# Patient Record
(demographics unavailable — no encounter records)

---

## 2024-10-23 NOTE — CONSULT LETTER
[Dear  ___] : Dear  [unfilled], [Consult Letter:] : I had the pleasure of evaluating your patient, [unfilled]. [Please see my note below.] : Please see my note below. [Consult Closing:] : Thank you very much for allowing me to participate in the care of this patient.  If you have any questions, please do not hesitate to contact me. [Sincerely,] : Sincerely, [FreeTextEntry3] : Randal Shepherd MD Division of pediatric orthopedics and rehabilitation [place] [address] Tel: [phone] Fax: [phone]

## 2024-10-23 NOTE — ASSESSMENT
[FreeTextEntry1] : 15-year-old male with right ankle sprain sustained 2 weeks ago.  Today's assessment was performed with the assistance of the patient's parent as an independent historian given the patient's age. Clinical findings and x-ray results were reviewed at length with the patient and parent. X-rays that were obtained of the right ankle showed no evidence of acute fractures or dislocations. Clinically, patient has mild pain over the lateral aspects of right ankle.  Discussed with patient and parent that the sprain will resolve with rest and weight bearing as tolerated. At this time, recommended for attending physical therapy sessions for injury prevention as well as improve strengthening of right ankle; prescription was provided to family. The patient will remain out of all physical activities including gym and sports; school note was provided to the family today.   We will plan to see him back in 4 weeks for re evaluation.  All questions and concerns were addressed. The family vocalized understanding and agreement to assessment and treatment plan.   IDacia, have acted as a scribe and documented the above information for Dr. Shepherd

## 2024-10-23 NOTE — REASON FOR VISIT
[Initial Evaluation] : an initial evaluation [Mother] : mother [FreeTextEntry1] : right ankle injury sustained 2 weeks ago

## 2024-10-23 NOTE — PHYSICAL EXAM
[FreeTextEntry1] : Gait: Presents ambulating independently without signs of antalgia. Good coordination and balance noted. GENERAL: alert, cooperative, in NAD SKIN: The skin is intact, warm, pink and dry over the area examined. EYES: Normal conjunctiva, normal eyelids and pupils were equal and round. ENT: normal ears, normal nose and normal lips. CARDIOVASCULAR: brisk capillary refill, but no peripheral edema. RESPIRATORY: The patient is in no apparent respiratory distress. They're taking full deep breaths without use of accessory muscles or evidence of audible wheezes or stridor without the use of a stethoscope. Normal respiratory effort. ABDOMEN: not examined  Left ankle: No gross deformity. No swelling about the ankle foot and distal tibia/fibula. No skin irritation or breakdown. Non TTP about the medial malleolus or lateral malleolus. Mild TTP over lateral aspects. Non tender over the ATFL, CFL, PTFL, deltoid ligament, posterior mal, forefoot, or toes Positive drawer test. 5/5 motor strength with EHL/FHL and ankle DF/PF SILT throughout LE Ankle is stable to stress maneuvers. Negative anterior drawer test. Foot is warm and appears well-perfused with brisk capillary refill to all toes 2+ dorsalis pedis pulse No evidence of lymphedema.

## 2024-10-23 NOTE — CONSULT LETTER
[Dear  ___] : Dear  [unfilled], [Consult Letter:] : I had the pleasure of evaluating your patient, [unfilled]. [Please see my note below.] : Please see my note below. [Consult Closing:] : Thank you very much for allowing me to participate in the care of this patient.  If you have any questions, please do not hesitate to contact me. [Sincerely,] : Sincerely, [FreeTextEntry3] : Randal Shepherd MD Division of pediatric orthopedics and rehabilitation [place] [address] Tel: [phone] Fax: [phone]

## 2024-10-23 NOTE — HISTORY OF PRESENT ILLNESS
[FreeTextEntry1] : 15-year-old male who presents today with mother for initial evaluation of right ankle injury sustained 2 weeks ago. He states that he fell down and twisted his right ankle. He was taken to urgent care where X-Rays right ankle was performed and confirmed no visible fracture and recommended for orthopedic evaluation. He was placed in an air cast which he used 1 day. Today he reports that he has mild discomfort or pain over the right ankle. Denies any tingling sensation or radiating pain. He is not taking any pain medication now. He is able to bear weight on his right lower extremity. Here for further orthopedic evaluation. none

## 2024-10-23 NOTE — DATA REVIEWED
[de-identified] : Right ankle radiographs were ordered, obtained, and independently reviewed in clinic on 10/23/24 depicting no evidence of acute fractures or dislocations.

## 2024-10-23 NOTE — BIRTH HISTORY
[Duration: ___ wks] : duration: [unfilled] weeks [] :  [___ lbs.] : [unfilled] lbs [Was child in NICU?] : Child was in NICU

## 2024-10-23 NOTE — HISTORY OF PRESENT ILLNESS
[FreeTextEntry1] : 15-year-old male who presents today with mother for initial evaluation of right ankle injury sustained 2 weeks ago. He states that he fell down and twisted his right ankle. He was taken to urgent care where X-Rays right ankle was performed and confirmed no visible fracture and recommended for orthopedic evaluation. He was placed in an air cast which he used 1 day. Today he reports that he has mild discomfort or pain over the right ankle. Denies any tingling sensation or radiating pain. He is not taking any pain medication now. He is able to bear weight on his right lower extremity. Here for further orthopedic evaluation.

## 2024-10-23 NOTE — DATA REVIEWED
[de-identified] : Right ankle radiographs were ordered, obtained, and independently reviewed in clinic on 10/23/24 depicting no evidence of acute fractures or dislocations.